# Patient Record
Sex: FEMALE | Race: WHITE | NOT HISPANIC OR LATINO | Employment: PART TIME | URBAN - NONMETROPOLITAN AREA
[De-identification: names, ages, dates, MRNs, and addresses within clinical notes are randomized per-mention and may not be internally consistent; named-entity substitution may affect disease eponyms.]

---

## 2017-02-17 ENCOUNTER — OFFICE VISIT (OUTPATIENT)
Dept: FAMILY MEDICINE | Facility: OTHER | Age: 32
End: 2017-02-17
Attending: FAMILY MEDICINE
Payer: COMMERCIAL

## 2017-02-17 VITALS
HEIGHT: 67 IN | TEMPERATURE: 97.5 F | WEIGHT: 131 LBS | DIASTOLIC BLOOD PRESSURE: 58 MMHG | RESPIRATION RATE: 20 BRPM | OXYGEN SATURATION: 97 % | SYSTOLIC BLOOD PRESSURE: 90 MMHG | HEART RATE: 78 BPM | BODY MASS INDEX: 20.56 KG/M2

## 2017-02-17 DIAGNOSIS — S93.402A SPRAIN OF LEFT ANKLE, UNSPECIFIED LIGAMENT, INITIAL ENCOUNTER: ICD-10-CM

## 2017-02-17 DIAGNOSIS — M25.572 ACUTE LEFT ANKLE PAIN: Primary | ICD-10-CM

## 2017-02-17 DIAGNOSIS — Z71.89 ACP (ADVANCE CARE PLANNING): Chronic | ICD-10-CM

## 2017-02-17 PROCEDURE — 73610 X-RAY EXAM OF ANKLE: CPT | Mod: TC | Performed by: RADIOLOGY

## 2017-02-17 PROCEDURE — 99213 OFFICE O/P EST LOW 20 MIN: CPT | Performed by: FAMILY MEDICINE

## 2017-02-17 RX ORDER — MULTIVIT-MIN/IRON/FOLIC ACID/K 18-600-40
1 CAPSULE ORAL DAILY
COMMUNITY

## 2017-02-17 ASSESSMENT — PAIN SCALES - GENERAL: PAINLEVEL: MILD PAIN (3)

## 2017-02-17 NOTE — PROGRESS NOTES
SUBJECTIVE:  Suzie is a 31 year old female who comes in today for left ankle pain after twisting it 4 1/2 weeks ago playing volleyball.  Jumped up to do a block and came down on someone else's foot, twisting, and falling.  Crawled off the court.  Tried to lightly bear weight thereafter.  Pain has improved some, gradually.  Very limited in her activity, walking on the treadmill with brace even uncomfortable.  She is typically very active, athlete. Remote sprains in high school.  No prior fracture.      Current Outpatient Prescriptions   Medication     Vitamin D, Cholecalciferol, 1000 UNITS TABS     paragard intrauterine copper     No current facility-administered medications for this visit.       No Known Allergies    Past Medical History   Diagnosis Date     Ganglion cyst      left; MRI     Past Surgical History   Procedure Laterality Date     [place] teeth extraction       Family History   Problem Relation Age of Onset     Substance Abuse Mother 47     alcoholism; passed age 47     Hypertension Father      Hyperlipidemia Father      DIABETES Father      Thyroid Disease No family hx of      Asthma No family hx of      Colon Cancer No family hx of      Breast Cancer No family hx of      Social History     Social History     Marital status:      Spouse name: N/A     Number of children: N/A     Years of education: N/A     Occupational History     Not on file.     Social History Main Topics     Smoking status: Never Smoker     Smokeless tobacco: Never Used     Alcohol use Yes      Comment: rare     Drug use: No     Sexual activity: Yes     Partners: Male     Birth control/ protection: IUD     Other Topics Concern     Parent/Sibling W/ Cabg, Mi Or Angioplasty Before 65f 55m? No     Social History Narrative       ROS:  General: negative  Skin: negative for, bruising  Musculoskeletal: positive for joint pain  Neurologic: negative for and numbness or tingling of feet    OBJECTIVE:  Vitals:    02/17/17 0809   BP: 90/58  "  BP Location: Right arm   Patient Position: Chair   Cuff Size: Adult Regular   Pulse: 78   Resp: 20   Temp: 97.5  F (36.4  C)   TempSrc: Tympanic   SpO2: 97%   Weight: 131 lb (59.4 kg)   Height: 5' 7\" (1.702 m)     GENERAL APPEARANCE: healthy, alert and no distress  MS: extremities normal- no gross deformities noted, normal muscle tone, peripheral pulses normal and tender to palpation left lateral ankle, anterior to posterior ligaments; non-tender over malleoli and base of 5th metatarsal; negative squeeze test; pain with dorsiflexion, plantar flexion and inversion, negative with eversion  SKIN: no suspicious lesions or rashes  PSYCH: mentation appears normal. and affect normal/bright    ASSESSMENT/ORDERS:    ICD-10-CM    1. Acute left ankle pain M25.572 XR Ankle Left G/E 3 Views     MR Ankle Left w/o Contrast   2. ACP (advance care planning) Z71.89    3. Sprain of left ankle, unspecified ligament, initial encounter S93.402A XR Ankle Left G/E 3 Views     MR Ankle Left w/o Contrast     PLAN:  Xray appears negative.  Formal read pending.  At this point, over a month out, limited improvement, in someone who is typically very athletic.  Decided to proceed with MRI to evaluate for ligamentous injury.    Patient Instructions   Will call with xray results.  Proceed with MRI.         Magaly Little    "

## 2017-02-17 NOTE — NURSING NOTE
"Chief Complaint   Patient presents with     Musculoskeletal Problem     Left ankle pain onset 4 1/2 weeks ago playing volleyball     *_* Health Care Directive *_*     Has ing       Initial BP 90/58 (BP Location: Right arm, Patient Position: Chair, Cuff Size: Adult Regular)  Pulse 78  Temp 97.5  F (36.4  C) (Tympanic)  Resp 20  Ht 5' 7\" (1.702 m)  Wt 131 lb (59.4 kg)  LMP 02/03/2017  SpO2 97%  BMI 20.52 kg/m2 Estimated body mass index is 20.52 kg/(m^2) as calculated from the following:    Height as of this encounter: 5' 7\" (1.702 m).    Weight as of this encounter: 131 lb (59.4 kg).  Medication Reconciliation: complete   Jessi Torres LPN        "

## 2017-02-17 NOTE — MR AVS SNAPSHOT
"              After Visit Summary   2017    Suzie Lyons    MRN: [id number]           Patient Information     Date Of Birth          1985        Visit Information        Provider Department      2017 8:15 AM Magaly Kim MD [facility]        Today's Diagnoses     Acute left ankle pain    -  1    ACP (advance care planning)        Sprain of left ankle, unspecified ligament, initial encounter          Care Instructions    Will call with xray results.  Proceed with MRI.        Follow-ups after your visit        Who to contact     If you have questions or need follow up information about today's clinic visit or your schedule please contact [facility] directly at [phone].  Normal or non-critical lab and imaging results will be communicated to you by MyChart, letter or phone within 4 business days after the clinic has received the results. If you do not hear from us within 7 days, please contact the clinic through MyChart or phone. If you have a critical or abnormal lab result, we will notify you by phone as soon as possible.  Submit refill requests through UI Robot or call your pharmacy and they will forward the refill request to us. Please allow 3 business days for your refill to be completed.          Additional Information About Your Visit        MyChart Information     UI Robot lets you send messages to your doctor, view your test results, renew your prescriptions, schedule appointments and more. To sign up, go to [email or web address] . Click on \"Log in\" on the left side of the screen, which will take you to the Welcome page. Then click on \"Sign up Now\" on the right side of the page.     You will be asked to enter the access code listed below, as well as some personal information. Please follow the directions to create your username and password.     Your access code is: [email or web address]  Expires: 2017  9:07 AM     Your access code will  in 90 days. If " "you need help or a new code, please call your [facility] clinic or [phone].        Care EveryWhere ID     This is your Care EveryWhere ID. This could be used by other organizations to access your [facility] medical records  [id number]        Your Vitals Were     Pulse Temperature Respirations Height Last Period Pulse Oximetry    78 97.5  F (36.4  C) (Tympanic) 20 5' 7\" (1.702 m) 02/03/2017 97%    BMI (Body Mass Index)                   20.52 kg/m2            Blood Pressure from Last 3 Encounters:   02/17/17 90/58   10/19/16 108/72   06/03/15 92/62    Weight from Last 3 Encounters:   02/17/17 131 lb (59.4 kg)   10/19/16 132 lb (59.9 kg)   06/03/15 134 lb (60.8 kg)              We Performed the Following     XR Ankle Left G/E 3 Views        Primary Care Provider Office Phone # Fax #    Magaly Kim [phone]        FAMILY [address]  [place] [address]        Thank you!     Thank you for choosing [facility]  for your care. Our goal is always to provide you with excellent care. Hearing back from our patients is one way we can continue to improve our services. Please take a few minutes to complete the written survey that you may receive in the mail after your visit with us. Thank you!             Your Updated Medication List - Protect others around you: Learn how to safely use, store and throw away your medicines at www.disposemymeds.org.          This list is accurate as of: 2/17/17  9:08 AM.  Always use your most recent med list.                   Brand Name Dispense Instructions for use    paragard intrauterine copper      1 each by Intrauterine route once       Vitamin D (Cholecalciferol) 1000 UNITS Tabs      Take 1 capsule by mouth daily         "

## 2017-02-20 ENCOUNTER — HOSPITAL ENCOUNTER (OUTPATIENT)
Dept: MRI IMAGING | Facility: HOSPITAL | Age: 32
Discharge: HOME | End: 2017-02-20
Attending: FAMILY MEDICINE | Admitting: FAMILY MEDICINE
Payer: COMMERCIAL

## 2017-02-20 PROCEDURE — 73721 MRI JNT OF LWR EXTRE W/O DYE: CPT | Mod: TC,LT

## 2017-02-22 ENCOUNTER — TELEPHONE (OUTPATIENT)
Dept: FAMILY MEDICINE | Facility: OTHER | Age: 32
End: 2017-02-22

## 2017-02-22 DIAGNOSIS — M25.572 ACUTE LEFT ANKLE PAIN: Primary | ICD-10-CM

## 2017-02-22 DIAGNOSIS — R93.89 ABNORMAL MRI: ICD-10-CM

## 2017-02-22 NOTE — TELEPHONE ENCOUNTER
Notify of results - tear of anterior talofibular ligament.  Also, achilles tendinosis, joint effusion, and swelling indicating bone bruise.  Possible tear of deltoid ligament as well.  Place ortho referral once notified and mail copy of report.    Patient notified - referral pended

## 2017-02-27 ENCOUNTER — OFFICE VISIT (OUTPATIENT)
Dept: ORTHOPEDICS | Facility: OTHER | Age: 32
End: 2017-02-27
Attending: FAMILY MEDICINE
Payer: COMMERCIAL

## 2017-02-27 VITALS
HEART RATE: 75 BPM | BODY MASS INDEX: 20.56 KG/M2 | HEIGHT: 67 IN | RESPIRATION RATE: 18 BRPM | SYSTOLIC BLOOD PRESSURE: 100 MMHG | OXYGEN SATURATION: 99 % | TEMPERATURE: 98.7 F | WEIGHT: 131 LBS | DIASTOLIC BLOOD PRESSURE: 70 MMHG

## 2017-02-27 DIAGNOSIS — R93.89 ABNORMAL MRI: ICD-10-CM

## 2017-02-27 DIAGNOSIS — M25.572 ACUTE LEFT ANKLE PAIN: ICD-10-CM

## 2017-02-27 PROCEDURE — 99203 OFFICE O/P NEW LOW 30 MIN: CPT | Performed by: ORTHOPAEDIC SURGERY

## 2017-02-27 ASSESSMENT — PAIN SCALES - GENERAL: PAINLEVEL: MILD PAIN (3)

## 2017-02-27 NOTE — PROGRESS NOTES
"Orthopedic Office Note    Chief Complaint: Suzie Lyons is a 31 year old female who is being seen for Pain following recent ankle injury left ankle    History of Present Illness:   Mechanism of Injury: A twisting/pivioting event started the pain. She jumped for a volleyball and came down on another players foot.  She was unable to continue play, could not walk without help  Location: left ankle lateral and posterior  Duration of Pain:   6 week(s)    Pain Quality: sharp  Pain is better with: Rest  Pain is worse with:  weightbearing  Treatment so far consists of:  Rest ice, NSAID  Associated Features: Swelling  Prior history of related problems: no Current treatment no longer helping.  Pain is limiting normal activities of daily living.  Here for Orthopedic consultation.      REVIEW OF SYSTEMS  General: negative for, night sweats, dizziness, fatigue  Resp: No shortness of breath and no cough  CV: negative for chest pain, syncope or near-syncope  GI: negative for nausea, vomiting and diarrhea  : negative for dysuria and hematuria  Musculoskeletal: as above  Neurologic: negative for syncope   Hematologic: negative for bleeding disorder    Physical Exam:  Vitals: /70 (BP Location: Right arm, Patient Position: Chair, Cuff Size: Adult Regular)  Pulse 75  Temp 98.7  F (37.1  C) (Tympanic)  Resp 18  Ht 5' 7\" (1.702 m)  Wt 131 lb (59.4 kg)  LMP 02/03/2017  SpO2 99%  BMI 20.52 kg/m2  BMI= Body mass index is 20.52 kg/(m^2).  Constitutional: healthy, alert and no acute distress   Psychiatric: mentation appears normal and affect normal/bright  NEURO: no focal deficits  SKIN: no excoriation or erythema. No signs of infection.  JOINT/EXTREMITIES:left Ankle Exam:   Knee:normal appearance  Lower leg:normal appearance    ANKLE  Inspection:Swelling:lateral    Tender:ATFL  Non-tender:PTFL  Range of Motion:dorsiflexion:  full  Strength:dorsiflexion:  4/5  Special tests:negative anterior drawer      FOOT  foot exam : Inspection " Palpation:   Swelling: lateral swelling  Tender::peroneal tendon:  at cuboid  Non-tender:proximal 5th metatarsal  Range of Motion:flexion of toes:  full      GAIT:antalgic        Diagnostic Modalities:  The radiologist report was reviewed of the left ankle MRI.  Independent visualization of the images was performed.      Impression: Left ankle ATFL tear with achiles tendonitis and deep bony contusion of the talus    Plan:  All of the above pertinent physical exam and imaging modalities findings was reviewed with Suzie. She will continue symptomatic treatment and not try to resume athletics until she has pain free ambulation and ROM.          Return to clinic in 4 weeks, or sooner as needed for changes.  Israel Turner

## 2017-02-27 NOTE — NURSING NOTE
"No chief complaint on file.      Initial /70 (BP Location: Right arm, Patient Position: Chair, Cuff Size: Adult Regular)  Pulse 75  Temp 98.7  F (37.1  C) (Tympanic)  Resp 18  Ht 5' 7\" (1.702 m)  Wt 131 lb (59.4 kg)  LMP 02/03/2017  SpO2 99%  BMI 20.52 kg/m2 Estimated body mass index is 20.52 kg/(m^2) as calculated from the following:    Height as of this encounter: 5' 7\" (1.702 m).    Weight as of this encounter: 131 lb (59.4 kg).  Medication Reconciliation: unable or not appropriate to perform   Franchesca Gary LPN      "

## 2017-02-27 NOTE — MR AVS SNAPSHOT
"              After Visit Summary   2/27/2017    Suzie Lyons    MRN: [id number]           Patient Information     Date Of Birth          1985        Visit Information        Provider Department      2/27/2017 1:00 PM Israel Turner DO  ORTHOPEDICS        Today's Diagnoses     Acute left ankle pain        Abnormal MRI           Follow-ups after your visit        Your next 10 appointments already scheduled     Apr 10, 2017  1:00 PM CDT   (Arrive by 12:45 PM)   Return Visit with Israel Turner DO    [facility]    [address] [facility] [id number] [phone]              Who to contact     If you have questions or need follow up information about today's clinic visit or your schedule please contact  ORTHOPEDICS directly at [phone].  Normal or non-critical lab and imaging results will be communicated to you by TMAThart, letter or phone within 4 business days after the clinic has received the results. If you do not hear from us within 7 days, please contact the clinic through TMAThart or phone. If you have a critical or abnormal lab result, we will notify you by phone as soon as possible.  Submit refill requests through ClipClock or call your pharmacy and they will forward the refill request to us. Please allow 3 business days for your refill to be completed.          Additional Information About Your Visit        TMAThart Information     ClipClock lets you send messages to your doctor, view your test results, renew your prescriptions, schedule appointments and more. To sign up, go to [email or web address] . Click on \"Log in\" on the left side of the screen, which will take you to the Welcome page. Then click on \"Sign up Now\" on the right side of the page.     You will be asked to enter the access code listed below, as well as some personal information. Please follow the directions to create your username and password.     Your access code is: [email or web address]  Expires: 5/18/2017 " " 9:07 AM     Your access code will  in 90 days. If you need help or a new code, please call your [facility] or [phone].        Care EveryWhere ID     This is your Care EveryWhere ID. This could be used by other organizations to access your [facility] [employer] medical records  [id number]        Your Vitals Were     Pulse Temperature Respirations Height Last Period Pulse Oximetry    75 98.7  F (37.1  C) (Tympanic) 18 5' 7\" (1.702 m) 2017 99%    BMI (Body Mass Index)                   20.52 kg/m2            Blood Pressure from Last 3 Encounters:   17 100/70   17 90/58   10/19/16 108/72    Weight from Last 3 Encounters:   17 131 lb (59.4 kg)   17 131 lb (59.4 kg)   10/19/16 132 lb (59.9 kg)              Today, you had the following     No orders found for display       Primary Care Provider Office Phone # Fax #    Magaly Kim [phone]        [facility]ILY PRACTICE [address] [place] [address]        Thank you!     Thank you for choosing  ORTHOPEDICS  for your care. Our goal is always to provide you with excellent care. Hearing back from our patients is one way we can continue to improve our services. Please take a few minutes to complete the written survey that you may receive in the mail after your visit with us. Thank you!             Your Updated Medication List - Protect others around you: Learn how to safely use, store and throw away your medicines at www.disposemymeds.org.          This list is accurate as of: 17  1:58 PM.  Always use your most recent med list.                   Brand Name Dispense Instructions for use    paragard intrauterine copper      1 each by Intrauterine route once       Vitamin D (Cholecalciferol) 1000 UNITS Tabs      Take 1 capsule by mouth daily         "

## 2017-02-27 NOTE — PROGRESS NOTES
SUBJECTIVE:                                                    Suzie Lyons is a 31 year old female who presents to clinic today for the following health issues:  Musculoskeletal problem/pain      Duration: 5 1/2 weeks ago    Description  Location: Left ankle    Intensity:  Mild but can become pain during activity     Accompanying signs and symptoms: radiation of pain to laterally all over left foot.    History  Previous similar problem: YES- possible sprain in HS  Previous evaluation:  x-ray and MRI    Precipitating or alleviating factors:  Trauma or overuse: YES- playing VB  Aggravating factors include: sitting,  standing, walking, climbing stairs, lifting and exercise    Therapies tried and outcome: ice, stretching and NSAID - ibuprofen not at this time.

## 2017-04-10 ENCOUNTER — OFFICE VISIT (OUTPATIENT)
Dept: ORTHOPEDICS | Facility: OTHER | Age: 32
End: 2017-04-10
Attending: ORTHOPAEDIC SURGERY
Payer: COMMERCIAL

## 2017-04-10 VITALS
DIASTOLIC BLOOD PRESSURE: 62 MMHG | WEIGHT: 135 LBS | SYSTOLIC BLOOD PRESSURE: 98 MMHG | HEIGHT: 68 IN | TEMPERATURE: 98.7 F | OXYGEN SATURATION: 99 % | HEART RATE: 63 BPM | BODY MASS INDEX: 20.46 KG/M2

## 2017-04-10 DIAGNOSIS — S93.402D SEVERE ANKLE SPRAIN, LEFT, SUBSEQUENT ENCOUNTER: Primary | ICD-10-CM

## 2017-04-10 PROCEDURE — 99212 OFFICE O/P EST SF 10 MIN: CPT | Performed by: ORTHOPAEDIC SURGERY

## 2017-04-10 ASSESSMENT — PAIN SCALES - GENERAL: PAINLEVEL: NO PAIN (1)

## 2017-04-10 NOTE — PROGRESS NOTES
"Patient presents today for follow-up for after a bad sprain of her left ankle.  She is now jogging, actually played in one of her volleyball games although not with her usual aggressiveness.  She was able to completely determine and her team actually won championship.  She states she is no longer having much trouble with swelling, is not having any sensation of giving out or popping in her ankle, and is pleased that she is able to resume her athletics.  Physical examination: There is a slight amount of swelling over the anterolateral aspect of the left ankle, and some crepitus and synovial inflammation in the anterolateral gutter.  The ankle was stable to varus valgus and anterior and posterior drawer forces.  She walks with a normal gait at regular walking speed.  Limb is neurovascularly intact    Assessment and plan: Resolving ankle sprain of the left ankle.  Plan is to increase activity as tolerated and follow up with orthopedics should she experience increasing pain, mechanical symptoms, or instability.BP 98/62 (BP Location: Right arm, Patient Position: Chair, Cuff Size: Adult Regular)  Pulse 63  Temp 98.7  F (37.1  C) (Tympanic)  Ht 5' 7.5\" (1.715 m)  Wt 135 lb (61.2 kg)  SpO2 99%  BMI 20.83 kg/m2  "

## 2017-04-10 NOTE — NURSING NOTE
"Chief Complaint   Patient presents with     RECHECK     Follow up amando cummings.       Initial BP 98/62 (BP Location: Right arm, Patient Position: Chair, Cuff Size: Adult Regular)  Pulse 63  Temp 98.7  F (37.1  C) (Tympanic)  Ht 5' 7.5\" (1.715 m)  Wt 135 lb (61.2 kg)  SpO2 99%  BMI 20.83 kg/m2 Estimated body mass index is 20.83 kg/(m^2) as calculated from the following:    Height as of this encounter: 5' 7.5\" (1.715 m).    Weight as of this encounter: 135 lb (61.2 kg).  Medication Reconciliation: complete   Bettina Montaño LPN      "

## 2017-04-10 NOTE — MR AVS SNAPSHOT
"              After Visit Summary   4/10/2017    Suzie Lyons    MRN: [id number]           Patient Information     Date Of Birth          1985        Visit Information        Provider Department      4/10/2017 1:00 PM Israel Turner, DO  ORTHOPEDICS        Today's Diagnoses     Severe ankle sprain, left, subsequent encounter    -  1       Follow-ups after your visit        Who to contact     If you have questions or need follow up information about today's clinic visit or your schedule please contact  ORTHOPEDICS directly at [phone].  Normal or non-critical lab and imaging results will be communicated to you by Trendyolhart, letter or phone within 4 business days after the clinic has received the results. If you do not hear from us within 7 days, please contact the clinic through [email or web address] or phone. If you have a critical or abnormal lab result, we will notify you by phone as soon as possible.  Submit refill requests through Zoyi or call your pharmacy and they will forward the refill request to us. Please allow 3 business days for your refill to be completed.          Additional Information About Your Visit        MyChart Information     Zoyi lets you send messages to your doctor, view your test results, renew your prescriptions, schedule appointments and more. To sign up, go to [email or web address] . Click on \"Log in\" on the left side of the screen, which will take you to the Welcome page. Then click on \"Sign up Now\" on the right side of the page.     You will be asked to enter the access code listed below, as well as some personal information. Please follow the directions to create your username and password.     Your access code is: [email or web address]  Expires: 2017 10:07 AM     Your access code will  in 90 days. If you need help or a new code, please call your [facility] or [phone].        Care EveryWhere ID     This is your Care EveryWhere ID. This could be used by other " "organizations to access your [place] medical records  [id number]        Your Vitals Were     Pulse Temperature Height Pulse Oximetry BMI (Body Mass Index)       63 98.7  F (37.1  C) (Tympanic) 5' 7.5\" (1.715 m) 99% 20.83 kg/m2        Blood Pressure from Last 3 Encounters:   04/10/17 98/62   02/27/17 100/70   02/17/17 90/58    Weight from Last 3 Encounters:   04/10/17 135 lb (61.2 kg)   02/27/17 131 lb (59.4 kg)   02/17/17 131 lb (59.4 kg)              Today, you had the following     No orders found for display       Primary Care Provider Office Phone # Fax #    Magaly Kim [phone]        F[facility]ILY PRACTICE [address] [place] [address] [place]  [facility] [address]        Thank you!     Thank you for choosing  ORTHOPEDICS  for your care. Our goal is always to provide you with excellent care. Hearing back from our patients is one way we can continue to improve our services. Please take a few minutes to complete the written survey that you may receive in the mail after your visit with us. Thank you!             Your Updated Medication List - Protect others around you: Learn how to safely use, store and throw away your medicines at www.disposemymeds.org.          This list is accurate as of: 4/10/17  1:28 PM.  Always use your most recent med list.                   Brand Name Dispense Instructions for use    paragard intrauterine copper      1 each by Intrauterine route once       Vitamin D (Cholecalciferol) 1000 UNITS Tabs      Take 1 capsule by mouth daily         "

## 2018-08-06 ENCOUNTER — HEALTH MAINTENANCE LETTER (OUTPATIENT)
Age: 33
End: 2018-08-06

## 2022-08-04 ENCOUNTER — TRANSFERRED RECORDS (OUTPATIENT)
Dept: HEALTH INFORMATION MANAGEMENT | Facility: CLINIC | Age: 37
End: 2022-08-04

## 2023-10-20 ENCOUNTER — TRANSFERRED RECORDS (OUTPATIENT)
Dept: HEALTH INFORMATION MANAGEMENT | Facility: CLINIC | Age: 38
End: 2023-10-20
Payer: COMMERCIAL

## 2023-10-21 ENCOUNTER — TRANSFERRED RECORDS (OUTPATIENT)
Dept: HEALTH INFORMATION MANAGEMENT | Facility: CLINIC | Age: 38
End: 2023-10-21
Payer: COMMERCIAL

## 2023-10-23 ENCOUNTER — TRANSCRIBE ORDERS (OUTPATIENT)
Dept: OTHER | Age: 38
End: 2023-10-23

## 2023-10-23 DIAGNOSIS — H52.13 MYOPIA, BILATERAL: Primary | ICD-10-CM

## 2023-10-23 DIAGNOSIS — H52.223 REGULAR ASTIGMATISM, BILATERAL: ICD-10-CM

## 2023-10-23 DIAGNOSIS — H50.05 ALTERNATING ESOTROPIA: ICD-10-CM

## 2024-02-14 NOTE — PROGRESS NOTES
"   1. Decompensated congenital esotropia     Patient is frequently breaking down and notices significant image jump form of diplopia. She has significant asthenopia type symptoms from straining to maintain fusion.  Discussed the risks, benefits, and alternatives of strabismus surgery and patient wishes to proceed.    Plan for right medial rectus recession on fixed suture    Suzie Lyons is a pleasant 38 year old White female who presents to my neuro-ophthalmology clinic today having been referred by Dr. Hdz for strabismus evaluation.    HPI:    Patient with history of alternating esotropia who presents for strabismus surgery evaluation.   She states she has had trouble with eye alignment since she was a kid. She reports that she feels like her eyes are turning more frequently lately and her eyes have been more fatigued. It seems harder to keep her eyes aligned. She frequently skis and mountain bikes but has difficulty with depth perception. She does note frustration with social situations as well. She gets fatigued trying to keep eyes in line and notices worsening after having a glass of wine or a drink. Driving has gotten harder as well. She does not have any diplopia. Occasionally gets \"blurring\" and glare especially when driving at night or when its raining.     While she denies typical diplopia she does endorse image jump diplopia.  Specifically when she is fatigued she will alternate fixation and is very bothered by the significant image jump attributable to her manifest esotropia.  This disorder of binocular function leads her to have difficulty working when fatigued/tired and also difficulty driving when tired.    She denies any history of eye surgeries as a child. She was not seen by an eye care provider until the 5th grade. She did do patching as a child for ~1 year but was not frequently using.    Independent historians:  Patient's  who is a dentist, Wild    Review of outside clinical " notes:     -- Visit with Dr. Hdz on 10/20/23:      Past medical history:    Patient Active Problem List   Diagnosis    Well woman exam with routine gynecological exam    ACP (advance care planning)       Patient has a current medication list which includes the following prescription(s): paragard intrauterine copper and vitamin d (cholecalciferol).. List is confirmed today.      Family history / social history:  Patient's family history includes Diabetes in her father; Hyperlipidemia in her father; Hypertension in her father; Substance Abuse (age of onset: 47) in her mother.     Patient  reports that she has never smoked. She has never used smokeless tobacco. She reports current alcohol use. She reports that she does not use drugs.     Exam:  Please see epic chart for complete exam     Tests ordered and interpreted today:  Sensorimotor exam shows an intermittent esotropia of 10-25 prism diopters depending on gaze position. This is relative concomitant. There is also a left Disassociated vertical deviation.  She experienced binocular diplopia with 30 base out prism to did not with 1-20 base out prism.  [place] four dot testing showed alternating suppression but she did have brief diplopia at distance.    We discussed in detail the risks, benefits, and alternatives of eye muscle correction surgery including the very rare risk of death or serious morbidity from a general anesthesia complication and the rare risk of severe vision loss in the operative eye(s) secondary to retinal detachment or endophthalmitis.  We discussed more likely sub-optimal outcomes including the unanticipated need for additional strabismus surgery.  Finally the patient was aware that prisms glasses may be required to optimize single vision following surgery.    After a thorough discussion of these risks, the patient decided to proceed with strabismus surgery.  The surgical plan is as follows:     1. Eye muscle correction, right eye.     Target  15-18 prism diopters esotropia correction.    Follow-up 1 week after strabismus surgery.    [facility]: [facility]  Prism free glasses for adjustment:  Non adjustable- no prism in current glasses         Precharting:  Johanna Anderson MD  Resident Physician, PGY-3  Department of Ophthalmology    45 minutes were spent on the date of the encounter by me doing chart review, history and exam, documentation, and further activities as noted above    Complete documentation of historical and exam elements from today's encounter can be found in the full encounter summary report (not reduplicated in this progress note).  I personally obtained the chief complaint(s) and history of present illness.  I confirmed and edited as necessary the review of systems, past medical/surgical history, family history, social history, and examination findings as documented by others; and I examined the patient myself.  I personally reviewed the relevant tests, images, and reports as documented above.  I formulated and edited as necessary the assessment and plan and discussed the findings and management plan with the patient and family.  I personally reviewed the ophthalmic test(s) associated with this encounter, agree with the interpretation(s) as documented by the resident/fellow, and have edited the corresponding report(s) as necessary.     Wild Kim MD

## 2024-02-16 ENCOUNTER — OFFICE VISIT (OUTPATIENT)
Dept: OPHTHALMOLOGY | Facility: CLINIC | Age: 39
End: 2024-02-16
Attending: OPTOMETRIST
Payer: COMMERCIAL

## 2024-02-16 ENCOUNTER — TELEPHONE (OUTPATIENT)
Dept: OPHTHALMOLOGY | Facility: CLINIC | Age: 39
End: 2024-02-16

## 2024-02-16 DIAGNOSIS — H52.13 MYOPIA, BILATERAL: ICD-10-CM

## 2024-02-16 DIAGNOSIS — H53.2 DOUBLE VISION: Primary | ICD-10-CM

## 2024-02-16 DIAGNOSIS — H52.223 REGULAR ASTIGMATISM, BILATERAL: ICD-10-CM

## 2024-02-16 DIAGNOSIS — H53.10 SUBJECTIVE VISUAL DISTURBANCE: ICD-10-CM

## 2024-02-16 DIAGNOSIS — H50.05 ALTERNATING ESOTROPIA: ICD-10-CM

## 2024-02-16 PROCEDURE — 92060 SENSORIMOTOR EXAMINATION: CPT

## 2024-02-16 PROCEDURE — 99204 OFFICE O/P NEW MOD 45 MIN: CPT | Mod: GC | Performed by: OPHTHALMOLOGY

## 2024-02-16 PROCEDURE — 99214 OFFICE O/P EST MOD 30 MIN: CPT | Performed by: OPHTHALMOLOGY

## 2024-02-16 PROCEDURE — 92060 SENSORIMOTOR EXAMINATION: CPT | Performed by: OPHTHALMOLOGY

## 2024-02-16 PROCEDURE — 92060 SENSORIMOTOR EXAMINATION: CPT | Mod: 26 | Performed by: OPHTHALMOLOGY

## 2024-02-16 RX ORDER — ASPIRIN 81 MG
1 TABLET, DELAYED RELEASE (ENTERIC COATED) ORAL
COMMUNITY

## 2024-02-16 RX ORDER — IBUPROFEN 200 MG
CAPSULE ORAL DAILY
COMMUNITY

## 2024-02-16 ASSESSMENT — VISUAL ACUITY
OS_CC: 20/20
METHOD: SNELLEN - LINEAR
CORRECTION_TYPE: GLASSES
OD_CC: 20/20

## 2024-02-16 ASSESSMENT — REFRACTION_WEARINGRX
SPECS_TYPE: SVL
OD_SPHERE: -3.00
OS_CYLINDER: +0.50
OS_SPHERE: -3.00
OS_AXIS: 091
OD_AXIS: 073
OD_CYLINDER: +0.75

## 2024-02-16 ASSESSMENT — TONOMETRY
OS_IOP_MMHG: 19
OD_IOP_MMHG: 20
IOP_METHOD: ICARE

## 2024-02-16 ASSESSMENT — CONF VISUAL FIELD
OS_INFERIOR_TEMPORAL_RESTRICTION: 0
OD_NORMAL: 1
OD_SUPERIOR_TEMPORAL_RESTRICTION: 0
OS_SUPERIOR_NASAL_RESTRICTION: 0
OS_SUPERIOR_TEMPORAL_RESTRICTION: 0
OS_INFERIOR_NASAL_RESTRICTION: 0
OD_INFERIOR_TEMPORAL_RESTRICTION: 0
METHOD: COUNTING FINGERS
OD_INFERIOR_NASAL_RESTRICTION: 0
OD_SUPERIOR_NASAL_RESTRICTION: 0
OS_NORMAL: 1

## 2024-02-16 ASSESSMENT — SLIT LAMP EXAM - LIDS
COMMENTS: NORMAL
COMMENTS: NORMAL

## 2024-02-16 ASSESSMENT — EXTERNAL EXAM - LEFT EYE: OS_EXAM: NORMAL

## 2024-02-16 ASSESSMENT — CUP TO DISC RATIO
OS_RATIO: 0.25
OD_RATIO: 0.25

## 2024-02-16 ASSESSMENT — EXTERNAL EXAM - RIGHT EYE: OD_EXAM: NORMAL

## 2024-02-16 NOTE — NURSING NOTE
Chief Complaint(s) and History of Present Illness(es)       Strabismus Evaluation    In both eyes.  Disease is present since childhood.  Since onset it is stable.  Associated symptoms include Negative for eye pain and blurred vision.  Pain was noted as 0/10.             Comments    Suzie Lyons is a 38 year old female sent for consultation by Dr. Flo Hdz for strabismus eval.  Suzie reports both eyes  crossing in, right eye worse than left.  She can control it but it has been getting harder to control.  Vision is stable with glasses on.  She has reduced depth perception.  Can suppress an eye and alternate her fixation which makes focusing hard to do.  She reports eye patching as a child but did not start patching until she was 9-10 years.  No hx of strabismus surgery.  Glasses are from her last visit with Dr. Hdz 10/2023.  She would like to know her options and if surgery is necessary.    She has a paternal aunt with eye crossing and decreased vision in one eye.  She also has a paternal first cousin with eye crossing.   ANGELI Rankin 2/16/2024 9:04 AM

## 2024-02-16 NOTE — LETTER
"2024    RE: Suzie Lyons  : 1985  MRN: [id number]    Dear Dr. Hdz    Thank you for referring your patient, Suzie Lyons, to my neuro-ophthalmology clinic recently.  After a thorough neuro-ophthalmic history and examination, I came to the following conclusions:     1. Decompensated congenital esotropia     Patient is frequently breaking down and notices significant image jump form of diplopia. She has significant asthenopia type symptoms from straining to maintain fusion.  Discussed the risks, benefits, and alternatives of strabismus surgery and patient wishes to proceed.    Plan for right medial rectus recession on fixed suture    Suzie Lyons is a pleasant 38 year old White female who presents to my neuro-ophthalmology clinic today having been referred by Dr. Hdz for strabismus evaluation.    HPI:    Patient with history of alternating esotropia who presents for strabismus surgery evaluation.   She states she has had trouble with eye alignment since she was a kid. She reports that she feels like her eyes are turning more frequently lately and her eyes have been more fatigued. It seems harder to keep her eyes aligned. She frequently skis and mountain bikes but has difficulty with depth perception. She does note frustration with social situations as well. She gets fatigued trying to keep eyes in line and notices worsening after having a glass of wine or a drink. Driving has gotten harder as well. She does not have any diplopia. Occasionally gets \"blurring\" and glare especially when driving at night or when its raining.     While she denies typical diplopia she does endorse image jump diplopia.  Specifically when she is fatigued she will alternate fixation and is very bothered by the significant image jump attributable to her manifest esotropia.  This disorder of binocular function leads her to have difficulty working when fatigued/tired and also difficulty driving when tired.    She " denies any history of eye surgeries as a child. She was not seen by an eye care provider until the 5th grade. She did do patching as a child for ~1 year but was not frequently using.    Independent historians:  Patient's  who is a dentist, Wild    Review of outside clinical notes:     -- Visit with Dr. Hdz on 10/20/23:      Past medical history:    Patient Active Problem List   Diagnosis    Well woman exam with routine gynecological exam    ACP (advance care planning)       Patient has a current medication list which includes the following prescription(s): paragard intrauterine copper and vitamin d (cholecalciferol).. List is confirmed today.      Family history / social history:  Patient's family history includes Diabetes in her father; Hyperlipidemia in her father; Hypertension in her father; Substance Abuse (age of onset: 47) in her mother.     Patient  reports that she has never smoked. She has never used smokeless tobacco. She reports current alcohol use. She reports that she does not use drugs.     Exam:  Please see epic chart for complete exam     Tests ordered and interpreted today:  Sensorimotor exam shows an intermittent esotropia of 10-25 prism diopters depending on gaze position. This is relative concomitant. There is also a left Disassociated vertical deviation.  She experienced binocular diplopia with 30 base out prism to did not with 1-20 base out prism.  [place] four dot testing showed alternating suppression but she did have brief diplopia at distance.    We discussed in detail the risks, benefits, and alternatives of eye muscle correction surgery including the very rare risk of death or serious morbidity from a general anesthesia complication and the rare risk of severe vision loss in the operative eye(s) secondary to retinal detachment or endophthalmitis.  We discussed more likely sub-optimal outcomes including the unanticipated need for additional strabismus surgery.  Finally the  patient was aware that prisms glasses may be required to optimize single vision following surgery.    After a thorough discussion of these risks, the patient decided to proceed with strabismus surgery.  The surgical plan is as follows:     1. Eye muscle correction, right eye.     Target 15-18 prism diopters esotropia correction.    Follow-up 1 week after strabismus surgery.    [facility] at: [facility]  Prism free glasses for adjustment:  Non adjustable- no prism in current glasses      Again, thank you for trusting me with the care of your patient.  For further exam details, please feel free to contact our office for additional records.  If you wish to contact me regarding this patient please email me at [email or web address] or give my clinic a call to arrange a phone conversation.    Sincerely,    Wild Kim MD  , Neuro-Ophthalmology and Adult Strabismus Surgery  The Teresa Whittington Chair in Neuro-Ophthalmology  Department of Ophthalmology and Visual Neurosciences  [facility]    DX: esotropia, strabismus surgery

## 2024-04-10 ENCOUNTER — ANESTHESIA EVENT (OUTPATIENT)
Dept: SURGERY | Facility: AMBULATORY SURGERY CENTER | Age: 39
End: 2024-04-10
Payer: COMMERCIAL

## 2024-04-15 ENCOUNTER — ANESTHESIA (OUTPATIENT)
Dept: SURGERY | Facility: AMBULATORY SURGERY CENTER | Age: 39
End: 2024-04-15
Payer: COMMERCIAL

## 2024-04-15 ENCOUNTER — HOSPITAL ENCOUNTER (OUTPATIENT)
Facility: AMBULATORY SURGERY CENTER | Age: 39
Discharge: HOME | End: 2024-04-15
Attending: OPHTHALMOLOGY
Payer: COMMERCIAL

## 2024-04-15 VITALS
TEMPERATURE: 97.7 F | BODY MASS INDEX: 22.18 KG/M2 | RESPIRATION RATE: 14 BRPM | DIASTOLIC BLOOD PRESSURE: 68 MMHG | OXYGEN SATURATION: 100 % | HEIGHT: 66 IN | WEIGHT: 138 LBS | HEART RATE: 52 BPM | SYSTOLIC BLOOD PRESSURE: 103 MMHG

## 2024-04-15 DIAGNOSIS — Z98.890 POSTOPERATIVE EYE STATE: Primary | ICD-10-CM

## 2024-04-15 LAB
HCG UR QL: NEGATIVE
INTERNAL QC OK POCT: NORMAL
POCT KIT EXPIRATION DATE: NORMAL
POCT KIT LOT NUMBER: NORMAL

## 2024-04-15 PROCEDURE — 67311 REVISE EYE MUSCLE: CPT

## 2024-04-15 PROCEDURE — 67311 REVISE EYE MUSCLE: CPT | Performed by: ANESTHESIOLOGY

## 2024-04-15 PROCEDURE — 81025 URINE PREGNANCY TEST: CPT | Performed by: PATHOLOGY

## 2024-04-15 PROCEDURE — 67311 REVISE EYE MUSCLE: CPT | Mod: RT

## 2024-04-15 RX ORDER — OXYCODONE HYDROCHLORIDE 5 MG/1
5 TABLET ORAL
Status: COMPLETED | OUTPATIENT
Start: 2024-04-15 | End: 2024-04-15

## 2024-04-15 RX ORDER — PROPOFOL 10 MG/ML
INJECTION, EMULSION INTRAVENOUS CONTINUOUS PRN
Status: DISCONTINUED | OUTPATIENT
Start: 2024-04-15 | End: 2024-04-15

## 2024-04-15 RX ORDER — OXYCODONE HYDROCHLORIDE 5 MG/1
10 TABLET ORAL
Status: DISCONTINUED | OUTPATIENT
Start: 2024-04-15 | End: 2024-04-16 | Stop reason: HOSPADM

## 2024-04-15 RX ORDER — ACETAMINOPHEN 325 MG/1
975 TABLET ORAL ONCE
Status: COMPLETED | OUTPATIENT
Start: 2024-04-15 | End: 2024-04-15

## 2024-04-15 RX ORDER — BALANCED SALT SOLUTION 6.4; .75; .48; .3; 3.9; 1.7 MG/ML; MG/ML; MG/ML; MG/ML; MG/ML; MG/ML
SOLUTION OPHTHALMIC PRN
Status: DISCONTINUED | OUTPATIENT
Start: 2024-04-15 | End: 2024-04-15 | Stop reason: HOSPADM

## 2024-04-15 RX ORDER — NALOXONE HYDROCHLORIDE 0.4 MG/ML
0.1 INJECTION, SOLUTION INTRAMUSCULAR; INTRAVENOUS; SUBCUTANEOUS
Status: DISCONTINUED | OUTPATIENT
Start: 2024-04-15 | End: 2024-04-15 | Stop reason: HOSPADM

## 2024-04-15 RX ORDER — FENTANYL CITRATE 50 UG/ML
50 INJECTION, SOLUTION INTRAMUSCULAR; INTRAVENOUS EVERY 5 MIN PRN
Status: DISCONTINUED | OUTPATIENT
Start: 2024-04-15 | End: 2024-04-15 | Stop reason: HOSPADM

## 2024-04-15 RX ORDER — HYDROMORPHONE HYDROCHLORIDE 1 MG/ML
0.2 INJECTION, SOLUTION INTRAMUSCULAR; INTRAVENOUS; SUBCUTANEOUS EVERY 5 MIN PRN
Status: DISCONTINUED | OUTPATIENT
Start: 2024-04-15 | End: 2024-04-15 | Stop reason: HOSPADM

## 2024-04-15 RX ORDER — ONDANSETRON 2 MG/ML
4 INJECTION INTRAMUSCULAR; INTRAVENOUS EVERY 30 MIN PRN
Status: DISCONTINUED | OUTPATIENT
Start: 2024-04-15 | End: 2024-04-16 | Stop reason: HOSPADM

## 2024-04-15 RX ORDER — ONDANSETRON 2 MG/ML
INJECTION INTRAMUSCULAR; INTRAVENOUS PRN
Status: DISCONTINUED | OUTPATIENT
Start: 2024-04-15 | End: 2024-04-15

## 2024-04-15 RX ORDER — ONDANSETRON 4 MG/1
4 TABLET, ORALLY DISINTEGRATING ORAL EVERY 30 MIN PRN
Status: DISCONTINUED | OUTPATIENT
Start: 2024-04-15 | End: 2024-04-15 | Stop reason: HOSPADM

## 2024-04-15 RX ORDER — ONDANSETRON 4 MG/1
4 TABLET, ORALLY DISINTEGRATING ORAL EVERY 30 MIN PRN
Status: DISCONTINUED | OUTPATIENT
Start: 2024-04-15 | End: 2024-04-16 | Stop reason: HOSPADM

## 2024-04-15 RX ORDER — FENTANYL CITRATE 50 UG/ML
25 INJECTION, SOLUTION INTRAMUSCULAR; INTRAVENOUS EVERY 5 MIN PRN
Status: DISCONTINUED | OUTPATIENT
Start: 2024-04-15 | End: 2024-04-15 | Stop reason: HOSPADM

## 2024-04-15 RX ORDER — ONDANSETRON 2 MG/ML
4 INJECTION INTRAMUSCULAR; INTRAVENOUS EVERY 30 MIN PRN
Status: DISCONTINUED | OUTPATIENT
Start: 2024-04-15 | End: 2024-04-15 | Stop reason: HOSPADM

## 2024-04-15 RX ORDER — FENTANYL CITRATE 50 UG/ML
INJECTION, SOLUTION INTRAMUSCULAR; INTRAVENOUS PRN
Status: DISCONTINUED | OUTPATIENT
Start: 2024-04-15 | End: 2024-04-15

## 2024-04-15 RX ORDER — PREDNISOLONE ACETATE 10 MG/ML
SUSPENSION/ DROPS OPHTHALMIC
Qty: 10 ML | Refills: 0 | Status: SHIPPED | OUTPATIENT
Start: 2024-04-15

## 2024-04-15 RX ORDER — OXYMETAZOLINE HYDROCHLORIDE 0.05 G/100ML
SPRAY NASAL PRN
Status: DISCONTINUED | OUTPATIENT
Start: 2024-04-15 | End: 2024-04-15 | Stop reason: HOSPADM

## 2024-04-15 RX ORDER — DEXAMETHASONE SODIUM PHOSPHATE 4 MG/ML
INJECTION, SOLUTION INTRA-ARTICULAR; INTRALESIONAL; INTRAMUSCULAR; INTRAVENOUS; SOFT TISSUE PRN
Status: DISCONTINUED | OUTPATIENT
Start: 2024-04-15 | End: 2024-04-15

## 2024-04-15 RX ORDER — SODIUM CHLORIDE, SODIUM LACTATE, POTASSIUM CHLORIDE, CALCIUM CHLORIDE 600; 310; 30; 20 MG/100ML; MG/100ML; MG/100ML; MG/100ML
INJECTION, SOLUTION INTRAVENOUS CONTINUOUS
Status: DISCONTINUED | OUTPATIENT
Start: 2024-04-15 | End: 2024-04-15 | Stop reason: HOSPADM

## 2024-04-15 RX ORDER — PROPOFOL 10 MG/ML
INJECTION, EMULSION INTRAVENOUS PRN
Status: DISCONTINUED | OUTPATIENT
Start: 2024-04-15 | End: 2024-04-15

## 2024-04-15 RX ORDER — LIDOCAINE HYDROCHLORIDE 20 MG/ML
INJECTION, SOLUTION INFILTRATION; PERINEURAL PRN
Status: DISCONTINUED | OUTPATIENT
Start: 2024-04-15 | End: 2024-04-15

## 2024-04-15 RX ORDER — SODIUM CHLORIDE, SODIUM LACTATE, POTASSIUM CHLORIDE, CALCIUM CHLORIDE 600; 310; 30; 20 MG/100ML; MG/100ML; MG/100ML; MG/100ML
INJECTION, SOLUTION INTRAVENOUS CONTINUOUS PRN
Status: DISCONTINUED | OUTPATIENT
Start: 2024-04-15 | End: 2024-04-15

## 2024-04-15 RX ORDER — GLYCOPYRROLATE 0.2 MG/ML
INJECTION, SOLUTION INTRAMUSCULAR; INTRAVENOUS PRN
Status: DISCONTINUED | OUTPATIENT
Start: 2024-04-15 | End: 2024-04-15

## 2024-04-15 RX ORDER — NALOXONE HYDROCHLORIDE 0.4 MG/ML
0.1 INJECTION, SOLUTION INTRAMUSCULAR; INTRAVENOUS; SUBCUTANEOUS
Status: DISCONTINUED | OUTPATIENT
Start: 2024-04-15 | End: 2024-04-16 | Stop reason: HOSPADM

## 2024-04-15 RX ORDER — HYDROMORPHONE HYDROCHLORIDE 1 MG/ML
0.4 INJECTION, SOLUTION INTRAMUSCULAR; INTRAVENOUS; SUBCUTANEOUS EVERY 5 MIN PRN
Status: DISCONTINUED | OUTPATIENT
Start: 2024-04-15 | End: 2024-04-15 | Stop reason: HOSPADM

## 2024-04-15 RX ORDER — LIDOCAINE 40 MG/G
CREAM TOPICAL
Status: DISCONTINUED | OUTPATIENT
Start: 2024-04-15 | End: 2024-04-15 | Stop reason: HOSPADM

## 2024-04-15 RX ADMIN — GLYCOPYRROLATE 0.2 MG: 0.2 INJECTION, SOLUTION INTRAMUSCULAR; INTRAVENOUS at 09:00

## 2024-04-15 RX ADMIN — ACETAMINOPHEN 975 MG: 325 TABLET ORAL at 07:31

## 2024-04-15 RX ADMIN — FENTANYL CITRATE 25 MCG: 50 INJECTION, SOLUTION INTRAMUSCULAR; INTRAVENOUS at 09:08

## 2024-04-15 RX ADMIN — SODIUM CHLORIDE, SODIUM LACTATE, POTASSIUM CHLORIDE, CALCIUM CHLORIDE: 600; 310; 30; 20 INJECTION, SOLUTION INTRAVENOUS at 09:33

## 2024-04-15 RX ADMIN — OXYCODONE HYDROCHLORIDE 5 MG: 5 TABLET ORAL at 10:12

## 2024-04-15 RX ADMIN — LIDOCAINE HYDROCHLORIDE 60 MG: 20 INJECTION, SOLUTION INFILTRATION; PERINEURAL at 09:00

## 2024-04-15 RX ADMIN — SODIUM CHLORIDE, SODIUM LACTATE, POTASSIUM CHLORIDE, CALCIUM CHLORIDE: 600; 310; 30; 20 INJECTION, SOLUTION INTRAVENOUS at 08:56

## 2024-04-15 RX ADMIN — SODIUM CHLORIDE, SODIUM LACTATE, POTASSIUM CHLORIDE, CALCIUM CHLORIDE: 600; 310; 30; 20 INJECTION, SOLUTION INTRAVENOUS at 07:31

## 2024-04-15 RX ADMIN — ONDANSETRON 4 MG: 2 INJECTION INTRAMUSCULAR; INTRAVENOUS at 09:07

## 2024-04-15 RX ADMIN — DEXAMETHASONE SODIUM PHOSPHATE 4 MG: 4 INJECTION, SOLUTION INTRA-ARTICULAR; INTRALESIONAL; INTRAMUSCULAR; INTRAVENOUS; SOFT TISSUE at 09:07

## 2024-04-15 RX ADMIN — PROPOFOL 200 MCG/KG/MIN: 10 INJECTION, EMULSION INTRAVENOUS at 09:00

## 2024-04-15 RX ADMIN — FENTANYL CITRATE 25 MCG: 50 INJECTION, SOLUTION INTRAMUSCULAR; INTRAVENOUS at 10:01

## 2024-04-15 RX ADMIN — PROPOFOL 200 MG: 10 INJECTION, EMULSION INTRAVENOUS at 09:00

## 2024-04-15 RX ADMIN — FENTANYL CITRATE 25 MCG: 50 INJECTION, SOLUTION INTRAMUSCULAR; INTRAVENOUS at 09:14

## 2024-04-15 NOTE — ANESTHESIA CARE TRANSFER NOTE
Patient: Suzie Lyons    Procedure: Procedure(s):  Eye muscle correction, right eye.       Diagnosis: Alternating esotropia [H50.05]  Diagnosis Additional Information: No value filed.    Anesthesia Type:   General     Note:    Oropharynx: spontaneously breathing  Level of Consciousness: drowsy  Oxygen Supplementation: face mask  Level of Supplemental Oxygen (L/min / FiO2): 6  Independent Airway: airway patency satisfactory and stable  Dentition: dentition unchanged  Vital Signs Stable: post-procedure vital signs reviewed and stable  Report to RN Given: handoff report given  Patient transferred to: PACU    Handoff Report: Identifed the Patient, Identified the Reponsible Provider, Reviewed the pertinent medical history, Discussed the surgical course, Reviewed Intra-OP anesthesia mangement and issues during anesthesia, Set expectations for post-procedure period and Allowed opportunity for questions and acknowledgement of understanding      Vitals:  Vitals Value Taken Time   /64    Temp 97.7    Pulse 89 04/15/24 0948   Resp 12 04/15/24 0948   SpO2 95 % 04/15/24 0948   Vitals shown include unfiled device data.    Electronically Signed By: PAOLA Esquivel CRNA  April 15, 2024  9:49 AM

## 2024-04-15 NOTE — ANESTHESIA POSTPROCEDURE EVALUATION
Patient: Suzie Lyons    Procedure: Procedure(s):  Eye muscle correction, right eye.       Anesthesia Type:  General    Note:  Disposition: Outpatient   Postop Pain Control: Uneventful            Sign Out: Well controlled pain   PONV: No   Neuro/Psych: Uneventful            Sign Out: Acceptable/Baseline neuro status   Airway/Respiratory: Uneventful            Sign Out: Acceptable/Baseline resp. status   CV/Hemodynamics: Uneventful            Sign Out: Acceptable CV status; No obvious hypovolemia; No obvious fluid overload   Other NRE:    DID A NON-ROUTINE EVENT OCCUR? No           Last vitals:  Vitals Value Taken Time   /68 04/15/24 1015   Temp 36.4  C (97.6  F) 04/15/24 1015   Pulse 62 04/15/24 1016   Resp 6 04/15/24 1016   SpO2 96 % 04/15/24 1016   Vitals shown include unfiled device data.    Electronically Signed By: Juan M Pleitez MD  April 15, 2024  12:56 PM

## 2024-04-15 NOTE — ANESTHESIA PROCEDURE NOTES
Airway       Patient location during procedure: OR       Procedure Start/Stop Times: 4/15/2024 9:01 AM  Staff -        Anesthesiologist:  Juan M Pleitez MD       CRNA: Rosalia Mccarthy APRN CRNA       Performed By: CRNA  Consent for Airway        Urgency: elective  Indications and Patient Condition       Indications for airway management: won-procedural         Mask difficulty assessment: 0 - not attempted    Final Airway Details       Final airway type: supraglottic airway    Supraglottic Airway Details        Type: LMA       Brand: I-Gel       LMA size: 4    Post intubation assessment        Placement verified by: capnometry and equal breath sounds        Number of attempts at approach: 1       Secured with: tape       Ease of procedure: easy       Dentition: Intact and Unchanged    Medication(s) Administered   Medication Administration Time: 4/15/2024 9:01 AM

## 2024-04-15 NOTE — OP NOTE
"ATTENDING SURGEON:  Wild Kim MD    DATE OF PROCEDURE:  April 15, 2024    FIRST SURGICAL ASSISTANT:  Johanna Anderson MD (Ophthalmology Resident)     ANESTHESIA:  General anesthesia    PREOPERATIVE DIAGNOSIS (ES):  Right esotropia     POSTOPERATIVE DIAGNOSIS (ES):  Same    NAME OF OPERATION:  Right medial rectus recession 5.0 mm    INDICATIONS FOR PROCEDURE:    The patient is a pleasant 38 year old with congenital strabismus (esotropia and disassociated vertical deviation). She appears to have drifted beyond her suppression scotoma and is symptomatic from an \"image jump\" form of diplopia. She was eager for strabismus surgery to improve / reduce this subjective vision disturbance.    I warned the patient about the risks, benefits, and alternatives of eye muscle surgery including a relatively small risk for severe infection, retinal detachment, and permanent vision loss of the eye.  I also discussed the possibility of postoperative double vision.  I discussed the possibility that due to postoperative double vision or a postoperative recurrent strabismus, the patient may require additional strabismus procedures in the future.  Understanding these risks, the patient decided to proceed with strabismus surgery.      DESCRIPTION OF PROCEDURE:    After the risks, benefits, and alternatives of eye muscle surgery were discussed with the patient and the patient's questions were answered both in clinic and on the day of surgery, written informed consent was obtained and witnessed in the preoperative holding area on the day of surgery.  The word \"yes\" was written over the right eye indicating that the patient consented to eye muscle correction in the right eye.  An intravenous line was placed and light intravenous sedation was given.  The patient was transported to the operating room where after appropriate monitors were placed, the patient underwent induction of general anesthesia without complication.      Both eyes were " prepped and draped in the usual sterile fashion for ophthalmic surgery and a lid speculum was placed in the right eye.  Forced duction testing in this eye revealed no restriction.  A trapezoidal nasal conjunctival flap was created using conjunctival forceps and Rhea scissors.  This was reflected backwards to expose the right medial rectus muscle which was isolated using a [place] muscle hook.  The muscle was freed from Tenon's capsule with blunt dissection using a Rhea scissors and cotton swab.  A double armed 6-0 Vicryl suture was then woven across the width of the muscle near it's insertion of the globe and tied to the edges.  The muscle was disinserted from the globe using Rhea scissors.  Both arms of the 6-0 Vicryl suture were then passed partial thickness through the sclera at a point measured to be 5.0 mm posterior to the physiologic muscle insertion using a caliper.  At this point, the ends of the suture were tied together.  The needles were cut off and the ends were cut short.  The conjunctival flap was then re-opposed in the surgical bed and held in place using two 6-0 plain gut sutures.  The lid speculum was removed from the operative eye.     Maxitrol ointment was placed in the right eye.  The patient was awakened from general anesthesia without complication and discharged to the recovery room in stable condition.       SPECIMENS REMOVED:  None.      ESTIMATED BLOOD LOSS:  1 mL or less.    INTRAOPERATIVE FLUIDS:  As per anesthesia records.      SPONGE/INSTRUMENT/NEEDLE COUNTS:  All sponge, instrument, and needle counts were correct times two.    CONDITION ON DISCHARGE FROM OPERATING ROOM:  Stable.      COMPLICATIONS:  None.     I was present for the entire procedure

## 2024-04-15 NOTE — DISCHARGE INSTRUCTIONS
Instructions for after your eye muscle surgery: Instill 1 cm of Maxitrol ophthalmic ointment in the operative eye(s) in 3 times per day until follow-up with Dr. Kim in about 1-2 weeks. The ointment is expected to blur vision but is necessary. You will also go home with a prescription for a steroid eye drop. Do NOT start this eye drop yet. When you see Dr. Kim at your post-operative visit he will tell you if and when to start the drops. Avoid all eye pressure or trauma for 7 days. No eye rubbing, straining, swimming, athletics, or outdoor activities in which dirt or foreign objects could enter the eye. ok to take a bath and shower immediately but don't run shower water on face. Tylenol or ibuprofen over the counter may be used for pain. Pain can occasionally be moderate for 1 or 2 days after surgery. If pain is severe or does not improve greatly with over the counter medications call our office. Return for follow-up with Dr. Kim as already scheduled (generally about 1-2 weeks after surgery). If you do not have an appointment already or you need to change your 1-2 week appointment, please call Sebastian Babb at [phone] or our  at [phone] If you are concerned about a healing problem after surgery, contact my assistant Efra Dow at [phone] or our triage nurse at [phone] during standard business hours. After hours for emergent concerns you may call the [facility] at [phone] and ask to speak with the ophthalmology resident on call.     [facility]  Home Care Following Anesthesia  For 24 hours after surgery:  Get plenty of rest.  A responsible adult must stay with you for at least 24 hours after you leave the surgery center.  Do not drive or use heavy equipment.  If you have weakness or tingling, don't drive or use heavy equipment until this feeling goes away.   Do not drink alcohol.   Avoid strenuous or  risky activities.  Ask for help when climbing stairs.  You may feel lightheaded.  IF so, sit for a few minutes before standing.  Have someone help you get up.   If you have nausea (feel sick to your stomach): Drink only clear liquids such as apple juice, ginger ale, broth or 7-Up.  Rest may also help.  Be sure to drink enough fluids.  Move to a regular diet as you feel able.   You may have a slight fever.  Call the doctor if your fever is over 100 F (37.7 C) (taken under the tongue) or lasts longer than 24 hours.  You may have a dry mouth, a sore throat, muscle aches or trouble sleeping. These should go away after 24 hours.  Do not make important or legal decisions.   It is recommended to avoid smoking.               Tips for taking pain medications  To get the best pain relief possible, remember these points:  Take pain medications as directed, before pain becomes severe.  Pain medication can upset your stomach: taking it with food may help.  Constipation is a common side effect of pain medication. Drink plenty of  fluids.  Eat foods high in fiber. Take a stool softener if recommended by your doctor or pharmacist.  Do not drink alcohol, drive or operate machinery while taking pain medications.  Ask about other ways to control pain, such as with heat, ice or relaxation.    Tylenol/Acetaminophen Consumption    If you feel your pain relief is insufficient, you may take Tylenol/Acetaminophen in addition to your narcotic pain medication.   Be careful not to exceed 4,000 mg of Tylenol/Acetaminophen in a 24 hour period from all sources.  If you are taking extra strength Tylenol/acetaminophen (500 mg), the maximum dose is 8 tablets in 24 hours.  If you are taking regular strength acetaminophen (325 mg), the maximum dose is 12 tablets in 24 hours.  Tylenol 975 mg given at 7:30 am.   Ok to take more after 1:30 pm.       Call a doctor for any of the following:  Signs of infection (fever, growing tenderness at the surgery  site, a large amount of drainage or bleeding, severe pain, foul-smelling drainage, redness, swelling).  It has been over 8 to 10 hours since surgery and you are still not able to urinate (pass water).  Headache for over 24 hours.  Signs of Covid-19 infection (temperature over 100 degrees, shortness of breath, cough, loss of taste/smell, generalized body aches, persistent headache, chills, sore throat, nausea/vomiting/diarrhea)  Your doctor is:       Dr. Wild Kim, Ophthalmology: [phone]               Or dial [phone] and ask for the resident on call for:  Ophthalmology  For emergency care, call the:  [place] [facility]:  [phone] (TTY for hearing impaired: [phone])

## 2024-04-15 NOTE — ANESTHESIA PREPROCEDURE EVALUATION
Anesthesia Pre-Procedure Evaluation    Patient: Suzie Lyons   MRN: [id number] : 1985        Procedure : Procedure(s):  Eye muscle correction, right eye.          Past Medical History:   Diagnosis Date    Ganglion cyst     left; MRI      Past Surgical History:   Procedure Laterality Date    wisdom teeth extraction        No Known Allergies   Social History     Tobacco Use    Smoking status: Never    Smokeless tobacco: Never   Substance Use Topics    Alcohol use: Yes     Comment: rare      Wt Readings from Last 1 Encounters:   04/10/17 61.2 kg (135 lb)        Anesthesia Evaluation            ROS/MED HX  ENT/Pulmonary:  - neg pulmonary ROS     Neurologic:  - neg neurologic ROS     Cardiovascular:  - neg cardiovascular ROS  (-) murmur   METS/Exercise Tolerance: >4 METS    Hematologic:  - neg hematologic  ROS     Musculoskeletal:  - neg musculoskeletal ROS     GI/Hepatic:  - neg GI/hepatic ROS     Renal/Genitourinary:  - neg Renal ROS     Endo:  - neg endo ROS     Psychiatric/Substance Use:  - neg psychiatric ROS     Infectious Disease:  - neg infectious disease ROS     Malignancy:  - neg malignancy ROS     Other:  - neg other ROS          Physical Exam    Airway        Mallampati: I   TM distance: > 3 FB   Neck ROM: full   Mouth opening: > 3 cm    Respiratory Devices and Support         Dental     Comment: Teeth examined without any significant abnormality, patient denies loose, missing or chipped teeth or anything removable.         Cardiovascular          Rhythm and rate: regular and normal (-) no murmur    Pulmonary   pulmonary exam normal        breath sounds clear to auscultation           OUTSIDE LABS:  CBC:   Lab Results   Component Value Date    WBC 6.0 10/19/2016    WBC 6.8 2007    HGB 14.3 10/19/2016    HGB 14.6 2007    HCT 40.7 10/19/2016    HCT 42.1 2007     10/19/2016     2007     BMP:   Lab Results   Component Value Date     10/19/2016    POTASSIUM 4.3  "10/19/2016    CHLORIDE 106 10/19/2016    CO2 28 10/19/2016    BUN 12 10/19/2016    CR 0.73 10/19/2016    GLC 85 10/19/2016     COAGS: No results found for: \"PTT\", \"INR\", \"FIBR\"  POC: No results found for: \"BGM\", \"HCG\", \"HCGS\"  HEPATIC:   Lab Results   Component Value Date    ALBUMIN 3.9 10/19/2016    PROTTOTAL 7.4 10/19/2016    ALT 22 10/19/2016    AST 20 10/19/2016    ALKPHOS 40 10/19/2016    BILITOTAL 0.5 10/19/2016     OTHER:   Lab Results   Component Value Date    LORETA 9.1 10/19/2016    TSH 1.19 10/19/2016       Anesthesia Plan    ASA Status:  1    NPO Status:  NPO Appropriate    Anesthesia Type: General.     - Airway: LMA   Induction: Intravenous, Propofol.   Maintenance: Balanced.        Consents    Anesthesia Plan(s) and associated risks, benefits, and realistic alternatives discussed. Questions answered and patient/representative(s) expressed understanding.     - Discussed:     - Discussed with:  Patient      - Extended Intubation/Ventilatory Support Discussed: No.      - Patient is DNR/DNI Status: No     Use of blood products discussed: No .     Postoperative Care    Pain management: IV analgesics, Oral pain medications, Multi-modal analgesia.   PONV prophylaxis: Ondansetron (or other 5HT-3), Dexamethasone or Solumedrol, Background Propofol Infusion     Comments:    Other Comments: Discussed plan for general anesthetic with LMA. Discussed risks of sore throat, post op pain/nausea, oropharyngeal damage, rare major complications.             Juan M Pleitez MD    I have reviewed the pertinent notes and labs in the chart from the past 30 days and (re)examined the patient.  Any updates or changes from those notes are reflected in this note.                  "

## 2024-04-15 NOTE — BRIEF OP NOTE
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   Surgeon: Shawn Kim MD    Assistants(s): Johanna Anderson MD      Estimated blood loss: Less than 1 cc    Specimens: None   Findings: See full operative report

## 2024-04-20 ENCOUNTER — HEALTH MAINTENANCE LETTER (OUTPATIENT)
Age: 39
End: 2024-04-20

## 2024-04-24 NOTE — PROGRESS NOTES
1. 2 week post-op status post strabismus surgery:     -Surgery: 4/15/24    PREOPERATIVE DIAGNOSIS (ES):  Right esotropia      POSTOPERATIVE DIAGNOSIS (ES):  Same     NAME OF OPERATION:  Right medial rectus recession 5.0 mm    Patient is having some mild irritation/soreness with headaches as well.     - Alignment: intermittent ET and left DVD  - Diplopia: none. Patient states that image jump has dramatically improved. She is happy.  - Healing up appropriately  - Maxitrol ophthalmic ointment: stop  - Start Predforte 1% four times a day in the operative eye(s) and decrease by one drop daily every week.  Course will complete in 1 month.    Return to clinic in 3 months or sooner as needed.         Marlene Clayton MD   Fellow, Neuro-Ophthalmology     Complete documentation of historical and exam elements from today's encounter can be found in the full encounter summary report (not reduplicated in this progress note).  I personally obtained the chief complaint(s) and history of present illness.  I confirmed and edited as necessary the review of systems, past medical/surgical history, family history, social history, and examination findings as documented by others; and I examined the patient myself.  I personally reviewed the relevant tests, images, and reports as documented above.  I formulated and edited as necessary the assessment and plan and discussed the findings and management plan with the patient and family     Wild Kim MD

## 2024-04-26 ENCOUNTER — OFFICE VISIT (OUTPATIENT)
Dept: OPHTHALMOLOGY | Facility: CLINIC | Age: 39
End: 2024-04-26
Attending: OPHTHALMOLOGY
Payer: COMMERCIAL

## 2024-04-26 DIAGNOSIS — H50.05 ALTERNATING ESOTROPIA: Primary | ICD-10-CM

## 2024-04-26 PROCEDURE — 99214 OFFICE O/P EST MOD 30 MIN: CPT | Performed by: OPHTHALMOLOGY

## 2024-04-26 PROCEDURE — 99024 POSTOP FOLLOW-UP VISIT: CPT | Mod: GC | Performed by: OPHTHALMOLOGY

## 2024-04-26 ASSESSMENT — SLIT LAMP EXAM - LIDS
COMMENTS: NORMAL
COMMENTS: NORMAL

## 2024-04-26 ASSESSMENT — CONF VISUAL FIELD
OS_INFERIOR_TEMPORAL_RESTRICTION: 0
OD_NORMAL: 1
OS_SUPERIOR_NASAL_RESTRICTION: 0
OS_SUPERIOR_TEMPORAL_RESTRICTION: 0
OD_INFERIOR_NASAL_RESTRICTION: 0
OD_INFERIOR_TEMPORAL_RESTRICTION: 0
OS_INFERIOR_NASAL_RESTRICTION: 0
OD_SUPERIOR_TEMPORAL_RESTRICTION: 0
OD_SUPERIOR_NASAL_RESTRICTION: 0
OS_NORMAL: 1
METHOD: COUNTING FINGERS

## 2024-04-26 ASSESSMENT — EXTERNAL EXAM - LEFT EYE: OS_EXAM: NORMAL

## 2024-04-26 ASSESSMENT — REFRACTION_WEARINGRX
SPECS_TYPE: SVL
OD_CYLINDER: +0.75
OD_SPHERE: -3.00
OS_CYLINDER: +0.50
OS_AXIS: 091
OD_AXIS: 073
OS_SPHERE: -3.00

## 2024-04-26 ASSESSMENT — VISUAL ACUITY
OD_CC: 20/25
CORRECTION_TYPE: GLASSES
OS_CC: 20/20
METHOD: SNELLEN - LINEAR
OS_CC+: -2

## 2024-04-26 ASSESSMENT — EXTERNAL EXAM - RIGHT EYE: OD_EXAM: NORMAL

## 2024-07-24 ENCOUNTER — APPOINTMENT (OUTPATIENT)
Dept: RADIOLOGY | Facility: CLINIC | Age: 39
End: 2024-07-24
Attending: EMERGENCY MEDICINE
Payer: COMMERCIAL

## 2024-07-24 ENCOUNTER — HOSPITAL ENCOUNTER (EMERGENCY)
Facility: CLINIC | Age: 39
Discharge: HOME | End: 2024-07-24
Attending: EMERGENCY MEDICINE | Admitting: EMERGENCY MEDICINE
Payer: COMMERCIAL

## 2024-07-24 VITALS
OXYGEN SATURATION: 97 % | HEIGHT: 67 IN | WEIGHT: 140 LBS | DIASTOLIC BLOOD PRESSURE: 66 MMHG | RESPIRATION RATE: 18 BRPM | HEART RATE: 70 BPM | TEMPERATURE: 97.7 F | BODY MASS INDEX: 21.97 KG/M2 | SYSTOLIC BLOOD PRESSURE: 109 MMHG

## 2024-07-24 DIAGNOSIS — S52.572A OTHER CLOSED INTRA-ARTICULAR FRACTURE OF DISTAL END OF LEFT RADIUS, INITIAL ENCOUNTER: ICD-10-CM

## 2024-07-24 DIAGNOSIS — S52.602A CLOSED FRACTURE OF DISTAL ENDS OF LEFT RADIUS AND ULNA, INITIAL ENCOUNTER: ICD-10-CM

## 2024-07-24 DIAGNOSIS — S09.90XA HEAD INJURY, INITIAL ENCOUNTER: ICD-10-CM
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
## 2024-07-24 DIAGNOSIS — T14.8XXA ABRASION: ICD-10-CM

## 2024-07-24 DIAGNOSIS — S52.502A CLOSED FRACTURE OF DISTAL ENDS OF LEFT RADIUS AND ULNA, INITIAL ENCOUNTER: ICD-10-CM

## 2024-07-24 PROCEDURE — 73562 X-RAY EXAM OF KNEE 3: CPT | Mod: RT

## 2024-07-24 PROCEDURE — 96361 HYDRATE IV INFUSION ADD-ON: CPT | Mod: 59

## 2024-07-24 PROCEDURE — 73080 X-RAY EXAM OF ELBOW: CPT | Mod: LT

## 2024-07-24 PROCEDURE — 73110 X-RAY EXAM OF WRIST: CPT | Mod: LT

## 2024-07-24 PROCEDURE — 96374 THER/PROPH/DIAG INJ IV PUSH: CPT | Mod: 59

## 2024-07-24 PROCEDURE — 258N000003 HC RX IP 258 OP 636: Performed by: EMERGENCY MEDICINE

## 2024-07-24 PROCEDURE — 73140 X-RAY EXAM OF FINGER(S): CPT | Mod: RT

## 2024-07-24 PROCEDURE — 29125 APPL SHORT ARM SPLINT STATIC: CPT | Mod: LT

## 2024-07-24 PROCEDURE — 99285 EMERGENCY DEPT VISIT HI MDM: CPT | Mod: 25

## 2024-07-24 PROCEDURE — 96376 TX/PRO/DX INJ SAME DRUG ADON: CPT | Mod: 59

## 2024-07-24 PROCEDURE — 73030 X-RAY EXAM OF SHOULDER: CPT | Mod: LT

## 2024-07-24 PROCEDURE — 250N000011 HC RX IP 250 OP 636: Performed by: EMERGENCY MEDICINE

## 2024-07-24 RX ORDER — HYDROMORPHONE HYDROCHLORIDE 1 MG/ML
0.5 INJECTION, SOLUTION INTRAMUSCULAR; INTRAVENOUS; SUBCUTANEOUS ONCE
Status: COMPLETED | OUTPATIENT
Start: 2024-07-24 | End: 2024-07-24

## 2024-07-24 RX ORDER — OXYCODONE HYDROCHLORIDE 5 MG/1
5 TABLET ORAL EVERY 6 HOURS PRN
Qty: 6 TABLET | Refills: 0 | Status: SHIPPED | OUTPATIENT
Start: 2024-07-24 | End: 2024-07-27

## 2024-07-24 RX ADMIN — HYDROMORPHONE HYDROCHLORIDE 0.5 MG: 1 INJECTION, SOLUTION INTRAMUSCULAR; INTRAVENOUS; SUBCUTANEOUS at 09:41

## 2024-07-24 RX ADMIN — SODIUM CHLORIDE 500 ML: 9 INJECTION, SOLUTION INTRAVENOUS at 07:50

## 2024-07-24 RX ADMIN — HYDROMORPHONE HYDROCHLORIDE 0.5 MG: 1 INJECTION, SOLUTION INTRAMUSCULAR; INTRAVENOUS; SUBCUTANEOUS at 08:19

## 2024-07-24 RX ADMIN — HYDROMORPHONE HYDROCHLORIDE 0.5 MG: 1 INJECTION, SOLUTION INTRAMUSCULAR; INTRAVENOUS; SUBCUTANEOUS at 07:50

## 2024-07-24 ASSESSMENT — ACTIVITIES OF DAILY LIVING (ADL)
ADLS_ACUITY_SCORE: 35

## 2024-07-24 ASSESSMENT — ENCOUNTER SYMPTOMS
RESPIRATORY NEGATIVE: 1
GASTROINTESTINAL NEGATIVE: 1
EYES NEGATIVE: 1
NECK PAIN: 0
LOSS OF CONSCIOUSNESS: 0
BACK PAIN: 0
CONSTITUTIONAL NEGATIVE: 1
PSYCHIATRIC NEGATIVE: 1
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
## 2024-07-24 NOTE — DISCHARGE INSTRUCTIONS
Read and follow the discharge instructions.    Dr. Green orthopedic doctor wants to see you right now at the clinic.    Elevate your arm is much as possible.    You may take Tylenol as needed for pain.    For severe pain you may take Roxicodone.  It is a narcotic medication.  Might make you constipated might make you nauseated.  It can be habit-forming.    Read and follow the head injury instructions.    Return immediately or call 911 if you are feeling lightheaded double vision vomiting headache neck pain pain is not well-controlled fingers turn blue or red or black or any other concerns.
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
## 2024-10-11 ENCOUNTER — OFFICE VISIT (OUTPATIENT)
Dept: OPHTHALMOLOGY | Facility: CLINIC | Age: 39
End: 2024-10-11
Attending: OPHTHALMOLOGY
Payer: COMMERCIAL

## 2024-10-11 DIAGNOSIS — H50.05 ALTERNATING ESOTROPIA: ICD-10-CM

## 2024-10-11 DIAGNOSIS — H53.2 DOUBLE VISION: Primary | ICD-10-CM

## 2024-10-11 DIAGNOSIS — H51.8 DISSOCIATED VERTICAL DEVIATION: ICD-10-CM

## 2024-10-11 PROCEDURE — 99212 OFFICE O/P EST SF 10 MIN: CPT | Performed by: OPHTHALMOLOGY

## 2024-10-11 PROCEDURE — 92060 SENSORIMOTOR EXAMINATION: CPT | Mod: 26 | Performed by: OPHTHALMOLOGY

## 2024-10-11 PROCEDURE — 92012 INTRM OPH EXAM EST PATIENT: CPT | Mod: GC | Performed by: OPHTHALMOLOGY

## 2024-10-11 PROCEDURE — 92060 SENSORIMOTOR EXAMINATION: CPT | Performed by: OPHTHALMOLOGY

## 2024-10-11 ASSESSMENT — REFRACTION_WEARINGRX
OS_AXIS: 091
OS_CYLINDER: +0.50
SPECS_TYPE: SVL
OS_SPHERE: -3.00
OD_CYLINDER: +0.75
OD_SPHERE: -3.00
OD_AXIS: 073

## 2024-10-11 ASSESSMENT — CONF VISUAL FIELD
OS_NORMAL: 1
OS_INFERIOR_NASAL_RESTRICTION: 0
OS_SUPERIOR_NASAL_RESTRICTION: 0
OS_INFERIOR_TEMPORAL_RESTRICTION: 0
OD_NORMAL: 1
OD_INFERIOR_NASAL_RESTRICTION: 0
METHOD: COUNTING FINGERS
OD_INFERIOR_TEMPORAL_RESTRICTION: 0
OD_SUPERIOR_TEMPORAL_RESTRICTION: 0
OS_SUPERIOR_TEMPORAL_RESTRICTION: 0
OD_SUPERIOR_NASAL_RESTRICTION: 0

## 2024-10-11 ASSESSMENT — TONOMETRY
OD_IOP_MMHG: 20
IOP_METHOD: ICARE
OS_IOP_MMHG: 16

## 2024-10-11 ASSESSMENT — REFRACTION_MANIFEST
OS_CYLINDER: +1.00
OD_SPHERE: -3.25
OS_SPHERE: -3.25
OD_CYLINDER: +0.50
OD_AXIS: 077
OS_AXIS: 090

## 2024-10-11 ASSESSMENT — VISUAL ACUITY
METHOD: SNELLEN - LINEAR
CORRECTION_TYPE: GLASSES
OS_CC: 20/20
OD_CC: 20/20

## 2024-10-11 ASSESSMENT — EXTERNAL EXAM - LEFT EYE: OS_EXAM: NORMAL

## 2024-10-11 ASSESSMENT — SLIT LAMP EXAM - LIDS
COMMENTS: NORMAL
COMMENTS: NORMAL

## 2024-10-11 ASSESSMENT — EXTERNAL EXAM - RIGHT EYE: OD_EXAM: NORMAL

## 2024-10-11 NOTE — PROGRESS NOTES
"   1. 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  Preoperatively she noticed image jumped diplopia due to drifting outside of her suppression scotoma.    Doing well postoperatively. States vision is single and patient is happy with vision.    Follow-up as needed with me.    Resume routine eye care with Dr. Hdz.    HPI:    Patient with history of alternating esotropia who presents for strabismus surgery evaluation.   She states she has had trouble with eye alignment since she was a kid. She reports that she feels like her eyes are turning more frequently lately and her eyes have been more fatigued. It seems harder to keep her eyes aligned. She frequently skis and mountain bikes but has difficulty with depth perception. She does note frustration with social situations as well. She gets fatigued trying to keep eyes in line and notices worsening after having a glass of wine or a drink. Driving has gotten harder as well. She does not have any diplopia. Occasionally gets \"blurring\" and glare especially when driving at night or when its raining.     While she denies typical diplopia she does endorse image jump diplopia.  Specifically when she is fatigued she will alternate fixation and is very bothered by the significant image jump attributable to her manifest esotropia.  This disorder of binocular function leads her to have difficulty working when fatigued/tired and also difficulty driving when tired.    She denies any history of eye surgeries as a child. She was not seen by an eye care provider until the 5th grade. She did do patching as a child for ~1 year but was not frequently using.    Review of outside clinical notes:     -- Visit with Dr. Hdz on 10/20/23:      Past medical history:    Patient Active Problem List   Diagnosis    Well woman exam with routine gynecological exam    ACP (advance care planning)    Alternating esotropia       Patient has a current " medication list which includes the following prescription(s): calcium carbonate 500 mg (elemental), multivitamin, therapeutic with minerals, paragard intrauterine copper, prednisolone acetate, and vitamin d (cholecalciferol).. List is confirmed today.      Family history / social history:  Patient's family history includes Diabetes in her father; Hyperlipidemia in her father; Hypertension in her father; Substance Abuse (age of onset: 47) in her mother.     Patient  reports that she has never smoked. She has never used smokeless tobacco. She reports current alcohol use. She reports that she does not use drugs.     Interval History since last visit on 4/26/2024    Patient s/p Right medial rectus recession 5.0 mm 4/15/24. Overall doing well. However, patient notes an area of blurry vision on right gaze. Patient does not think the area of blurriness is double vision. She wonders if there is something on her glasses but states that the blurriness is consistent and reproducible.     Exam:  Please see epic chart for complete exam     Sensorimotor examination shows a relatively concomitant and small angle Pattie deviation in all gaze positions measuring around 8-10 prism diopters.  The patient is comfortable during her testing and did not have any true binocular double vision nor image jump diplopia during the office visit.         Lillie Olmstead MD  PGY-3  Department of Ophthalmology  October 11, 2024 9:56 AM     Complete documentation of historical and exam elements from today's encounter can be found in the full encounter summary report (not reduplicated in this progress note).  I personally obtained the chief complaint(s) and history of present illness.  I confirmed and edited as necessary the review of systems, past medical/surgical history, family history, social history, and examination findings as documented by others; and I examined the patient myself.  I personally reviewed the relevant tests, images, and reports as  documented above.  I formulated and edited as necessary the assessment and plan and discussed the findings and management plan with the patient and family.  I personally reviewed the ophthalmic test(s) associated with this encounter, agree with the interpretation(s) as documented by the resident/fellow, and have edited the corresponding report(s) as necessary.     Wild Kim MD

## 2024-10-11 NOTE — LETTER
"2024    RE: Suzie Lyons  : 1985  MRN: [id number]    Dear Providers,    I saw our mutual patient, Suzie Lyons, in follow-up in my clinic recently.  After a thorough neuro-ophthalmic history and examination, I came to the following conclusions:     1. 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  Preoperatively she noticed image jumped diplopia due to drifting outside of her suppression scotoma.    Doing well postoperatively. States vision is single and patient is happy with vision.    Follow-up as needed with me.    Resume routine eye care with Dr. Hdz.    HPI: Patient with history of alternating esotropia who presents for strabismus surgery evaluation.   She states she has had trouble with eye alignment since she was a kid. She reports that she feels like her eyes are turning more frequently lately and her eyes have been more fatigued. It seems harder to keep her eyes aligned. She frequently skis and mountain bikes but has difficulty with depth perception. She does note frustration with social situations as well. She gets fatigued trying to keep eyes in line and notices worsening after having a glass of wine or a drink. Driving has gotten harder as well. She does not have any diplopia. Occasionally gets \"blurring\" and glare especially when driving at night or when its raining.     While she denies typical diplopia she does endorse image jump diplopia.  Specifically when she is fatigued she will alternate fixation and is very bothered by the significant image jump attributable to her manifest esotropia.  This disorder of binocular function leads her to have difficulty working when fatigued/tired and also difficulty driving when tired.    She denies any history of eye surgeries as a child. She was not seen by an eye care provider until the 5th grade. She did do patching as a child for ~1 year but was not frequently using.    Review of " outside clinical notes:     -- Visit with Dr. Hdz on 10/20/23:      Past medical history:    Patient Active Problem List   Diagnosis    Well woman exam with routine gynecological exam    ACP (advance care planning)    Alternating esotropia       Patient has a current medication list which includes the following prescription(s): calcium carbonate 500 mg (elemental), multivitamin, therapeutic with minerals, paragard intrauterine copper, prednisolone acetate, and vitamin d (cholecalciferol).. List is confirmed today.      Family history / social history:  Patient's family history includes Diabetes in her father; Hyperlipidemia in her father; Hypertension in her father; Substance Abuse (age of onset: 47) in her mother.     Patient  reports that she has never smoked. She has never used smokeless tobacco. She reports current alcohol use. She reports that she does not use drugs.     Interval History since last visit on 4/26/2024    Patient s/p Right medial rectus recession 5.0 mm 4/15/24. Overall doing well. However, patient notes an area of blurry vision on right gaze. Patient does not think the area of blurriness is double vision. She wonders if there is something on her glasses but states that the blurriness is consistent and reproducible.     Exam:  Please see epic chart for complete exam     Sensorimotor examination shows a relatively concomitant and small angle Pattie deviation in all gaze positions measuring around 8-10 prism diopters.  The patient is comfortable during her testing and did not have any true binocular double vision nor image jump diplopia during the office visit.    For further exam details, please feel free to contact our office for additional records.  If you wish to contact me regarding this patient please email me at [email or web address] or give my clinic a call to arrange a phone conversation.    Sincerely,    Wild Kim MD  , Neuro-Ophthalmology and Adult Strabismus  Surgery  [facility] in Neuro-Ophthalmology  Department of Ophthalmology and Visual Neurosciences  [facility]

## 2025-05-10 ENCOUNTER — HEALTH MAINTENANCE LETTER (OUTPATIENT)
Age: 40
End: 2025-05-10

## (undated) DEVICE — ESU CORD BIPOLAR GREEN 10-4000

## (undated) DEVICE — APPLICATOR COTTON TIP 3" PKG OF 10 34831010

## (undated) DEVICE — SU VICRYL 6-0 S-29 12" J556G

## (undated) DEVICE — GLOVE BIOGEL PI MICRO SZ 7.5 48575

## (undated) DEVICE — PACK MINOR EYE CUSTOM ASC

## (undated) DEVICE — SOL WATER IRRIG 500ML BOTTLE 2F7113

## (undated) DEVICE — SU PLAIN 6-0 G-1DA 18" 770G

## (undated) DEVICE — DRAPE STERI TOWEL LG 1010

## (undated) DEVICE — EYE MARKING PAD 581057

## (undated) DEVICE — SU VICRYL 6-0 S-14DA 18" UND J670G

## (undated) DEVICE — EYE PREP BETADINE 5% SOLUTION 30ML 0065-0411-30

## (undated) DEVICE — LINEN TOWEL PACK X5 5464

## (undated) RX ORDER — PROPOFOL 10 MG/ML
INJECTION, EMULSION INTRAVENOUS
Status: DISPENSED
Start: 2024-04-15

## (undated) RX ORDER — ONDANSETRON 2 MG/ML
INJECTION INTRAMUSCULAR; INTRAVENOUS
Status: DISPENSED
Start: 2024-04-15

## (undated) RX ORDER — FENTANYL CITRATE 50 UG/ML
INJECTION, SOLUTION INTRAMUSCULAR; INTRAVENOUS
Status: DISPENSED
Start: 2024-04-15

## (undated) RX ORDER — DEXAMETHASONE SODIUM PHOSPHATE 4 MG/ML
INJECTION, SOLUTION INTRA-ARTICULAR; INTRALESIONAL; INTRAMUSCULAR; INTRAVENOUS; SOFT TISSUE
Status: DISPENSED
Start: 2024-04-15

## (undated) RX ORDER — OXYCODONE HYDROCHLORIDE 5 MG/1
TABLET ORAL
Status: DISPENSED
Start: 2024-04-15

## (undated) RX ORDER — GLYCOPYRROLATE 0.2 MG/ML
INJECTION INTRAMUSCULAR; INTRAVENOUS
Status: DISPENSED
Start: 2024-04-15